# Patient Record
Sex: FEMALE | Race: WHITE | NOT HISPANIC OR LATINO | URBAN - METROPOLITAN AREA
[De-identification: names, ages, dates, MRNs, and addresses within clinical notes are randomized per-mention and may not be internally consistent; named-entity substitution may affect disease eponyms.]

---

## 2017-02-02 ENCOUNTER — EMERGENCY (EMERGENCY)
Facility: HOSPITAL | Age: 22
LOS: 1 days | Discharge: HOME | End: 2017-02-02
Attending: EMERGENCY MEDICINE | Admitting: EMERGENCY MEDICINE
Payer: COMMERCIAL

## 2017-02-02 VITALS
OXYGEN SATURATION: 98 % | DIASTOLIC BLOOD PRESSURE: 78 MMHG | RESPIRATION RATE: 18 BRPM | HEART RATE: 74 BPM | SYSTOLIC BLOOD PRESSURE: 122 MMHG

## 2017-02-02 VITALS
HEART RATE: 62 BPM | SYSTOLIC BLOOD PRESSURE: 119 MMHG | OXYGEN SATURATION: 98 % | TEMPERATURE: 98 F | DIASTOLIC BLOOD PRESSURE: 75 MMHG | RESPIRATION RATE: 18 BRPM | WEIGHT: 117.07 LBS

## 2017-02-02 DIAGNOSIS — Z79.2 LONG TERM (CURRENT) USE OF ANTIBIOTICS: ICD-10-CM
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
## 2017-02-02 DIAGNOSIS — Z79.1 LONG TERM (CURRENT) USE OF NON-STEROIDAL ANTI-INFLAMMATORIES (NSAID): ICD-10-CM
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
## 2017-02-02 DIAGNOSIS — T85.898A OTHER SPECIFIED COMPLICATION OF OTHER INTERNAL PROSTHETIC DEVICES, IMPLANTS AND GRAFTS, INITIAL ENCOUNTER: ICD-10-CM
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
## 2017-02-02 PROCEDURE — 99284 EMERGENCY DEPT VISIT MOD MDM: CPT | Mod: 25

## 2017-02-02 PROCEDURE — 99284 EMERGENCY DEPT VISIT MOD MDM: CPT

## 2017-02-02 NOTE — ED ADULT NURSE NOTE - OBJECTIVE STATEMENT
Patient c/o stich in her right 5th finger. Patient states she recently had tendon repair with Dr. Cabral. Will continue to monitor patient.

## 2017-02-02 NOTE — ED PROVIDER NOTE - DETAILS:
Signed out to me by PA, pt w/ suture protruding from surgery site after tendon repair. Evaluated by Dr. Cabral who was expecting pt in ED, removed suture and recommended augmentin. Agree with PA exam as above.

## 2017-02-02 NOTE — ED PROVIDER NOTE - PHYSICAL EXAMINATION
Suture protruding through palmar aspect of skin at right little finger DIP joint.  Normal ROM of finger.  No skin erythema. No swelling.  No tenderness.  No drainage.

## 2017-02-02 NOTE — ED PROVIDER NOTE - OBJECTIVE STATEMENT
s/p tendon and nerve repair in Aug 2016 (Dr Cabral).  Having mild skin irritation at surgical site right 5th finger for past few days and last night noticed a suture protruding through the skin.  No fever/chills, skin rash or drainage, tingling or numbness.

## 2017-02-02 NOTE — ED ADULT TRIAGE NOTE - CHIEF COMPLAINT QUOTE
c/o "knotted green string" coming out of the right 5th finger, from previous nerve and tendon surgery on August 2016.

## 2017-02-02 NOTE — ED PROVIDER NOTE - MEDICAL DECISION MAKING DETAILS
Suture protruding from skin at finger tendon surgery site.  No signs of infection.  NVI and normal tendon function.  Suture removed by Dr Cerna
